# Patient Record
Sex: MALE | URBAN - METROPOLITAN AREA
[De-identification: names, ages, dates, MRNs, and addresses within clinical notes are randomized per-mention and may not be internally consistent; named-entity substitution may affect disease eponyms.]

---
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
## 2021-06-24 ENCOUNTER — EMERGENCY (EMERGENCY)
Facility: HOSPITAL | Age: 21
LOS: 1 days | Discharge: HOME | End: 2021-06-24
Attending: EMERGENCY MEDICINE
Payer: MEDICAID

## 2021-06-24 VITALS
TEMPERATURE: 98 F | OXYGEN SATURATION: 98 % | HEIGHT: 69 IN | DIASTOLIC BLOOD PRESSURE: 68 MMHG | SYSTOLIC BLOOD PRESSURE: 103 MMHG | WEIGHT: 154.98 LBS | HEART RATE: 75 BPM | RESPIRATION RATE: 18 BRPM

## 2021-06-24 PROCEDURE — 70486 CT MAXILLOFACIAL W/O DYE: CPT

## 2021-06-24 PROCEDURE — G1004: CPT

## 2021-06-24 PROCEDURE — 99284 EMERGENCY DEPT VISIT MOD MDM: CPT

## 2021-06-24 PROCEDURE — 70450 CT HEAD/BRAIN W/O DYE: CPT

## 2021-06-24 PROCEDURE — 72125 CT NECK SPINE W/O DYE: CPT | Mod: 26

## 2021-06-24 PROCEDURE — 70450 CT HEAD/BRAIN W/O DYE: CPT | Mod: 26

## 2021-06-24 PROCEDURE — 70486 CT MAXILLOFACIAL W/O DYE: CPT | Mod: 26,MG

## 2021-06-24 PROCEDURE — 99284 EMERGENCY DEPT VISIT MOD MDM: CPT | Mod: 25

## 2021-06-24 PROCEDURE — 72125 CT NECK SPINE W/O DYE: CPT

## 2021-06-24 NOTE — ED ADULT TRIAGE NOTE - IDEAL BODY WEIGHT(KG)
89y Female with PMH of HFmEF (43% in April 2021), HTN, HLD, GERD, DVT (in 6/2020 - on Eliquis), dementia (AAOx0), depression, COVID19 in 4/2020 who presents from [facility] nurse (Jony, cell # [phone]) with hypoxia to 86 on RA    # AC systolic CHF-- EF 43% in April 2021-- no need for echo-- got lasix 40mg iv once daily. Moderate AR on echo-- will give her donnie edose of HCTZ from AM  # Hx of DVT-- continue eliquis-- duplex is negative  # Advanced dementia-- takes seroquel at night, also on memantidine and miratzapine.  # multiloculated cystic liver mass- patient looks healthy and liver enz are normal.   I spoke with her niece -- patient should be ready for DC AM   covid pending and PT eval-- they did not see her today-- activity orders not in place. CM aware DC plan AM.     71

## 2021-06-24 NOTE — ED PROVIDER NOTE - CLINICAL SUMMARY MEDICAL DECISION MAKING FREE TEXT BOX
CAT scan of head, neck, face, then reevaluate CAT scan of head, neck, face, then reevaluate.normal scans.pt counseled/pain control/follow up pmd
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
## 2021-06-24 NOTE — ED PROVIDER NOTE - PATIENT PORTAL LINK FT
You can access the FollowMyHealth Patient Portal offered by [facility] by registering at the following website: [email or web address]. By joining [employer]’s FollowMyHealth portal, you will also be able to view your health information using other applications (apps) compatible with our system.
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
## 2021-06-24 NOTE — ED PROVIDER NOTE - PHYSICAL EXAMINATION
left upper eyelid is ecchymotic. There is no visibile deformity or break in the skin. No epi, ear bleeding, head deformity, laceration, hematoma. There is no neck deformity, but there is a reduced ROM due to pain, and no point tenderness.

## 2021-10-13 ENCOUNTER — EMERGENCY (EMERGENCY)
Facility: HOSPITAL | Age: 21
LOS: 1 days | Discharge: HOME | End: 2021-10-13
Attending: STUDENT IN AN ORGANIZED HEALTH CARE EDUCATION/TRAINING PROGRAM
Payer: MEDICAID

## 2021-10-13 VITALS
OXYGEN SATURATION: 98 % | RESPIRATION RATE: 18 BRPM | SYSTOLIC BLOOD PRESSURE: 112 MMHG | HEIGHT: 69 IN | DIASTOLIC BLOOD PRESSURE: 70 MMHG | HEART RATE: 64 BPM | TEMPERATURE: 98 F | WEIGHT: 164.91 LBS

## 2021-10-13 PROCEDURE — 99283 EMERGENCY DEPT VISIT LOW MDM: CPT

## 2021-10-13 NOTE — ED ADULT NURSE NOTE - NSIMPLEMENTINTERV_GEN_ALL_ED
Implemented All Universal Safety Interventions:  [place] to call system. Call bell, personal items and telephone within reach. Instruct patient to call for assistance. Room bathroom lighting operational. Non-slip footwear when patient is off stretcher. Physically safe environment: no spills, clutter or unnecessary equipment. Stretcher in lowest position, wheels locked, appropriate side rails in place.

## 2021-10-13 NOTE — ED PROVIDER NOTE - OBJECTIVE STATEMENT
21-year-old male no PMH presenting with L neck swelling x2 days. Also notes tonsillar swelling on that side. Denies fevers, chills, swallowing difficulty, shortness of breath, or any other concerns.

## 2021-10-13 NOTE — ED PROVIDER NOTE - PATIENT PORTAL LINK FT
You can access the FollowMyHealth Patient Portal offered by [facility] by registering at the following website: [email or web address]. By joining [employer]’s FollowMyHealth portal, you will also be able to view your health information using other applications (apps) compatible with our system.

## 2021-10-13 NOTE — ED PROVIDER NOTE - CLINICAL SUMMARY MEDICAL DECISION MAKING FREE TEXT BOX
21-year-old male no PMH presenting with L neck swelling x2 days - exam c/w tonsillitis. Will discharge with PO augmentin, return precautions, PMD/ENT followup if symptoms persist.

## 2021-10-13 NOTE — ED ADULT NURSE NOTE - OBJECTIVE STATEMENT
As per pt, c/o L sided neck swelling w/ tonsillar inflammation noted x2 days. Pt denies all other symptoms.

## 2021-10-13 NOTE — ED PROVIDER NOTE - NSFOLLOWUPINSTRUCTIONS_ED_ALL_ED_FT
You were seen in the emergency department for: neck swelling  Your diagnosis for this visit was: tonsillitis, lymph node swelling   From this ED visit you were prescribed: Amoxicillin-Clavulanate twice a day for 7 days    You may be contacted by the Emergency Department Referrals Coordinator to set up your follow-up appointment within 24-48 hours of your discharge, Monday to Friday. We recommend you follow up with: your primary care doctor, Ear-Nose-Throat doctor if your symptoms continue      Please return to the Emergency Department if you experience any of the following symptoms:   - Shortness of breath or trouble breathing  - Pressure, pain or tightness in the chest  - Face drooping, arm weakness or speech difficulty  - Persistence of severe vomiting  - Head injury or loss of consciousness  - Nonstop bleeding or an open wound    (1) Follow up with your primary care physician within the next 24-48 hours as discussed. In addition, we did not find evidence of a life threatening illness on your testing here today, but listed below are the specialists that will be necessary to see as an outpatient to continue the workup.  Please call the numbers listed below or [phone] to set up the necessary appointments.  (2) Take Tylenol (up to 1000mg or 1 g)  and/or Motrin (up to 600mg) up to every 6 hours as needed for pain.   (3) If you had an IV (intravenous) line placed, it was removed. Sometimes, after IV removal, that area can be tender for a few days; if it develops redness and swelling, those could be signs of infection; in which case, return to the Emergency Department for assessment.  (4) Please continue taking all of your home medications as directed.

## 2021-10-15 ENCOUNTER — APPOINTMENT (OUTPATIENT)
Dept: OTOLARYNGOLOGY | Facility: CLINIC | Age: 21
End: 2021-10-15
Payer: MEDICAID

## 2021-10-15 VITALS
WEIGHT: 165 LBS | SYSTOLIC BLOOD PRESSURE: 120 MMHG | HEART RATE: 57 BPM | BODY MASS INDEX: 24.44 KG/M2 | DIASTOLIC BLOOD PRESSURE: 69 MMHG | HEIGHT: 69 IN

## 2021-10-15 DIAGNOSIS — Z80.0 FAMILY HISTORY OF MALIGNANT NEOPLASM OF DIGESTIVE ORGANS: ICD-10-CM

## 2021-10-15 DIAGNOSIS — Z87.09 PERSONAL HISTORY OF OTHER DISEASES OF THE RESPIRATORY SYSTEM: ICD-10-CM

## 2021-10-15 DIAGNOSIS — Z87.898 PERSONAL HISTORY OF OTHER SPECIFIED CONDITIONS: ICD-10-CM

## 2021-10-15 DIAGNOSIS — R59.0 LOCALIZED ENLARGED LYMPH NODES: ICD-10-CM

## 2021-10-15 DIAGNOSIS — Z87.39 PERSONAL HISTORY OF OTHER DISEASES OF THE MUSCULOSKELETAL SYSTEM AND CONNECTIVE TISSUE: ICD-10-CM

## 2021-10-15 DIAGNOSIS — J35.1 HYPERTROPHY OF TONSILS: ICD-10-CM

## 2021-10-15 PROCEDURE — 31575 DIAGNOSTIC LARYNGOSCOPY: CPT

## 2021-10-15 PROCEDURE — 99203 OFFICE O/P NEW LOW 30 MIN: CPT | Mod: 25

## 2021-10-15 NOTE — PROCEDURE
[de-identified] : Procedure performed: Fiberoptic Laryngeal Endoscopy - Diagnostic\par After informed verbal consent topical neosynephrine and lidocaine were applied, the fiberoptic nasal endoscope was\par passed via the R/L nasal cavity without incident. The patient tolerated the procedure quite well.\par The nasal cavity was normal without evidence of masses, polyps, lesions or drainage. The nasal septum and turbinates are within normal limits.\par Nasopharynx was normal without lesions or masses. Eustachian tube orifice normal bilaterals. Oropharynx and Hypopharyngeal mucosa are within normal limits without lesions masses, ulcerations or concerning findings. True and false vocal folds are within normal limits, normal sensory and motor examination. The base of tongue, vallecula, epiglottis are within normal limits without evidence of lesions or abnormalities.\par \par Positive Findings:\par Other than the obvious enlarged right tonsil I see no other mucosal abnormalities the base of tongue vallecula and epiglottis did not appear to be involved with this process there is no pharyngeal wall involvement appears to be tonsillar enlargement

## 2021-10-15 NOTE — PHYSICAL EXAM
[de-identified] : Right level 2 cervical adenopathy palpable mildly tender but notably enlarged relative to the contralateral side [Midline] : trachea located in midline position [de-identified] : Marked asymmetry with the right tonsil larger than left with exudate, left side is within normal limits [Normal] : no rashes

## 2021-10-15 NOTE — REASON FOR VISIT
[Initial Evaluation] : an initial evaluation for [FreeTextEntry2] : referred by [facility] ([place]) for swollen lymph nodes.

## 2021-10-15 NOTE — HISTORY OF PRESENT ILLNESS
[de-identified] : 21 year old male referred by [facility] for swollen lymph nodes. \par Reports went to [facility] emergency room 10/13/2021 for painful lump on right side of neck, was prescribed Augmentin but has not taken. Denies imaging.\par Reports mild headaches with pressure behind left eye for 2-3 days when he first noticed lump on neck 10/08/2021, took advil for 1 day and resolved.\par Reports odynophagia with solids and liquids. Reports raspy voice for 3 days. \par Denies dysphagia, aspirations, dyspnea, otalgia.\par Reports noticed lump under right axillary this morning, painful when he puts pressure on it. \par History of marijuana use for 3 years- quit 2 months ago. Denies alcohol use.\par Denies fevers, chills, night sweats, weight loss. \par Denies COVID vaccine. \par  [FreeTextEntry1] : Had a discussion with the patient he has never had this issue before is having no fever chills nausea vomiting, process appears to be limited to the right side.  In addition he was prescribed some antibiotics he has not started taking this yet and will fill his prescription later today for Augmentin 875.

## 2021-10-20 ENCOUNTER — EMERGENCY (EMERGENCY)
Facility: HOSPITAL | Age: 21
LOS: 1 days | Discharge: HOME | End: 2021-10-20
Attending: STUDENT IN AN ORGANIZED HEALTH CARE EDUCATION/TRAINING PROGRAM
Payer: MEDICAID

## 2021-10-20 VITALS
WEIGHT: 164.91 LBS | HEIGHT: 69 IN | RESPIRATION RATE: 89 BRPM | DIASTOLIC BLOOD PRESSURE: 82 MMHG | SYSTOLIC BLOOD PRESSURE: 125 MMHG | OXYGEN SATURATION: 99 % | TEMPERATURE: 98 F | HEART RATE: 89 BPM

## 2021-10-20 VITALS
DIASTOLIC BLOOD PRESSURE: 80 MMHG | SYSTOLIC BLOOD PRESSURE: 120 MMHG | RESPIRATION RATE: 18 BRPM | HEART RATE: 85 BPM | OXYGEN SATURATION: 99 %

## 2021-10-20 PROCEDURE — 99283 EMERGENCY DEPT VISIT LOW MDM: CPT

## 2021-10-20 RX ADMIN — Medication 300 MILLIGRAM(S): at 17:29

## 2021-10-20 NOTE — ED ADULT NURSE NOTE - NSIMPLEMENTINTERV_GEN_ALL_ED
Implemented All Universal Safety Interventions:  [place] to call system. Call bell, personal items and telephone within reach. Instruct patient to call for assistance. Room bathroom lighting operational. Non-slip footwear when patient is off stretcher. Physically safe environment: no spills, clutter or unnecessary equipment. Stretcher in lowest position, wheels locked, appropriate side rails in place.

## 2021-10-20 NOTE — ED ADULT NURSE NOTE - CAS ELECT INFOMATION PROVIDED
Encounter addended by: Kathy Laguerre PsyD on: 12/3/2019 5:27 PM   Actions taken: Order Reconciliation Section accessed DC instructions

## 2021-10-20 NOTE — ED PROVIDER NOTE - OBJECTIVE STATEMENT
21 year old male with a history of asthma presents with right tonsillar swelling and right lymph node swelling for 8 days. Patient reports he was evaluated here 6 days ago. Prescribed Augmentin and told to follow up with ENT. Followed up with ENT 4 days ago who said to continue to take the antibiotics. Patient returns as he reports the tonsilar swelling is worsening. Reports his right lymph node has decreased in size. Reports he is now having diarrhea from the antibiotics as well. Denies fevers, inability to swallow, trouble breathing, cough, rhinorrhea.

## 2021-10-20 NOTE — ED PROVIDER NOTE - PATIENT PORTAL LINK FT
You can access the FollowMyHealth Patient Portal offered by [facility] by registering at the following website: [email or web address]. By joining [employer]’s FollowMyHealth portal, you will also be able to view your health information using other applications (apps) compatible with our system.

## 2021-10-20 NOTE — ED PROVIDER NOTE - CLINICAL SUMMARY MEDICAL DECISION MAKING FREE TEXT BOX
21 year old male with right sided tonsilar swelling and exudate. +right anterior cervical lymph node swelling. Likely tim-tonsillar abscess. Will drain and dc home on clindamycin.

## 2021-10-20 NOTE — ED PROCEDURE NOTE - ATTENDING CONTRIBUTION TO CARE
I performed the initial face to face bedside interview with this patient regarding history of present illness, review of symptoms and past medical, social and family history.  I completed an independent physical examination.  I was the initial provider who evaluated this patient.  The history, review of symptoms and examination was documented by the scribe in my presence and I attest to the accuracy of the documentation.  I have signed out the follow up of any pending tests (i.e. labs, radiological studies) to the PA/NP.  I have discussed the patient’s plan of care and disposition with the PA/NP.     well appearing male, no acute distress, normal work of breathing, attempted I&D. patient tolerated well.

## 2021-10-20 NOTE — ED PROVIDER NOTE - THROAT FINDINGS
Right tonsilar swelling with white exudate./OROPHARYNGEAL EXUDATE/THROAT RED/uvula midline/HOARSE/MUFFLED VOICE/PERITONSILLAR ABSCESS/TONSILLAR SWELLING

## 2021-10-20 NOTE — ED PROVIDER NOTE - ATTENDING CONTRIBUTION TO CARE
I performed the initial face to face bedside interview with this patient regarding history of present illness, review of symptoms and past medical, social and family history.  I completed an independent physical examination.  I was the initial provider who evaluated this patient.  The history, review of symptoms and examination was documented by the scribe in my presence and I attest to the accuracy of the documentation.  I have signed out the follow up of any pending tests (i.e. labs, radiological studies) to the PA/NP.  I have discussed the patient’s plan of care and disposition with the PA/NP.     well appearing male, no acute distress, uvula midline, right peritonsillar abscess. attempted drainage, but minimal output. will give antibiotics and ENT follow-up.

## 2021-10-20 NOTE — ED PROVIDER NOTE - NSFOLLOWUPCLINICS_GEN_ALL_ED_FT
[facility]  ENT  [address] [place] [address]  Phone: [phone]  Fax: [phone]

## 2021-10-20 NOTE — ED PROVIDER NOTE - NSFOLLOWUPINSTRUCTIONS_ED_ALL_ED_FT
Follow up with the ENT doctor in 24 hours.   If you experience any new or worsening symptoms or if you are concerned you can always come back to the emergency for a re-evaluation.  If there were any prescriptions given to you during the visit today take them as prescribed. If you have any questions you can ask the pharmacist.

## 2021-10-20 NOTE — ED PROVIDER NOTE - PROGRESS NOTE DETAILS
Abscess did not drain during I&D. Will give clinda and have patient follow up with ENT asap.     Pt is well appearing walking with steady gait, stable for discharge and follow up without fail with medical doctor. I had a detailed discussion with the patient and/or guardian regarding the historical points, exam findings, and any diagnostic results supporting the discharge diagnosis. Pt educated on care and need for follow up. Strict return instructions and red flag signs and symptoms discussed with patient. Questions answered. Pt shows understanding of discharge information and agrees to follow.

## 2021-11-05 ENCOUNTER — APPOINTMENT (OUTPATIENT)
Dept: OTOLARYNGOLOGY | Facility: CLINIC | Age: 21
End: 2021-11-05

## 2022-07-18 ENCOUNTER — EMERGENCY (EMERGENCY)
Facility: HOSPITAL | Age: 22
LOS: 1 days | Discharge: LEFT BEFORE DISCHARGE | End: 2022-07-18
Attending: STUDENT IN AN ORGANIZED HEALTH CARE EDUCATION/TRAINING PROGRAM
Payer: MEDICAID

## 2022-07-18 VITALS
HEIGHT: 69 IN | SYSTOLIC BLOOD PRESSURE: 115 MMHG | WEIGHT: 184.97 LBS | OXYGEN SATURATION: 98 % | TEMPERATURE: 98 F | HEART RATE: 60 BPM | DIASTOLIC BLOOD PRESSURE: 72 MMHG

## 2022-07-18 DIAGNOSIS — Z98.890 OTHER SPECIFIED POSTPROCEDURAL STATES: Chronic | ICD-10-CM

## 2022-07-18 PROCEDURE — 99282 EMERGENCY DEPT VISIT SF MDM: CPT

## 2022-07-18 NOTE — ED PROVIDER NOTE - CLINICAL SUMMARY MEDICAL DECISION MAKING FREE TEXT BOX
21 year old male sent in for left foot and ankle injury. Will dinorah x-ray for ankle and foot. Pt was offered medication for pain but declined. 21 year old male presents for left foot and ankle injury. Will get x-ray for ankle and foot. Pt was offered medication for pain but declined.

## 2022-07-18 NOTE — ED ADULT NURSE NOTE - NSICDXPASTSURGICALHX_GEN_ALL_CORE_FT
Contacted  pharmacy.  Per Tech Rx was filled and picked up. Ok to disregard refill request.     PAST SURGICAL HISTORY:  H/O umbilical hernia repair

## 2022-07-18 NOTE — ED PROVIDER NOTE - NS ED ATTENDING STATEMENT MOD
This was a shared visit with the STEFFANIE. I reviewed and verified the documentation and independently performed the documented:

## 2022-07-18 NOTE — ED PROVIDER NOTE - PROGRESS NOTE DETAILS
Patient eloped.  Called on the phone, states he was waiting too long for XR so he left.  Then started cursing at provider and disconnected.

## 2022-07-18 NOTE — ED PROVIDER NOTE - OBJECTIVE STATEMENT
21 year old male with PMHx of asthma and umbilical hernia repair is presenting to the ED with chief complaint of left foot and ankle pain for the past 2 days. Patient was playing basketball when he jumped and landed on someone's foot, which caused an eversion. Patient has pain bearing weight on his left leg. No other injuries. NKDA

## 2022-07-18 NOTE — ED PROVIDER NOTE - MUSCULOSKELETAL, MLM
Mild swelling and tenderness to palpation to the lateral malleolus of left ankle. Mild tenderness to left lateral foot. DP/PT pulses 2+. Left leg with full range of motion. Able to lift toes and ambulate on foot., NO swelling or tenderness to palpation to the left ankle or foot but patient endorsing pain to the area. DP/PT pulses 2+. Left leg with full range of motion. Able to lift toes and ambulate on foot.,

## 2023-01-05 NOTE — ED ADULT TRIAGE NOTE - BP NONINVASIVE SYSTOLIC (MM HG)
Left message for patient to remind of appt date and time and the need to do labs prior to appt.  
128

## 2023-01-29 ENCOUNTER — EMERGENCY (EMERGENCY)
Facility: HOSPITAL | Age: 23
LOS: 1 days | Discharge: HOME | End: 2023-01-29
Attending: STUDENT IN AN ORGANIZED HEALTH CARE EDUCATION/TRAINING PROGRAM
Payer: MEDICAID

## 2023-01-29 VITALS
DIASTOLIC BLOOD PRESSURE: 75 MMHG | RESPIRATION RATE: 17 BRPM | OXYGEN SATURATION: 98 % | SYSTOLIC BLOOD PRESSURE: 128 MMHG | TEMPERATURE: 99 F | HEART RATE: 87 BPM | WEIGHT: 179.9 LBS | HEIGHT: 69 IN

## 2023-01-29 DIAGNOSIS — Z98.890 OTHER SPECIFIED POSTPROCEDURAL STATES: Chronic | ICD-10-CM

## 2023-01-29 LAB
RAPID RVP RESULT: SIGNIFICANT CHANGE UP
SARS-COV-2 RNA SPEC QL NAA+PROBE: SIGNIFICANT CHANGE UP

## 2023-01-29 PROCEDURE — 99283 EMERGENCY DEPT VISIT LOW MDM: CPT

## 2023-01-29 PROCEDURE — 0225U NFCT DS DNA&RNA 21 SARSCOV2: CPT

## 2023-01-29 PROCEDURE — 87081 CULTURE SCREEN ONLY: CPT

## 2023-01-29 PROCEDURE — 99284 EMERGENCY DEPT VISIT MOD MDM: CPT

## 2023-01-29 PROCEDURE — 96372 THER/PROPH/DIAG INJ SC/IM: CPT

## 2023-01-29 RX ORDER — IBUPROFEN 200 MG
1 TABLET ORAL
Qty: 20 | Refills: 0
Start: 2023-01-29

## 2023-01-29 RX ORDER — KETOROLAC TROMETHAMINE 30 MG/ML
30 SYRINGE (ML) INJECTION ONCE
Refills: 0 | Status: DISCONTINUED | OUTPATIENT
Start: 2023-01-29 | End: 2023-01-29

## 2023-01-29 RX ADMIN — Medication 30 MILLIGRAM(S): at 09:31

## 2023-01-29 NOTE — ED PROVIDER NOTE - PHYSICAL EXAMINATION
CONSTITUTIONAL: non-toxic, well appearing  SKIN: no rash, no petechiae.  EYES: pink conjunctiva, anicteric  ENT: tongue and uvular midline, no exudates, moist mucous membranes, no trismus, no drooling  NECK: Supple; no meningismus, FROM neck, no nuchal rigidity  CARD: RRR, no murmurs, equal radial pulses bilaterally 2+  RESP: CTAB, no respiratory distress  ABD: Soft, non-tender, non-distended, no peritoneal signs  EXT: Normal ROM x4. No edema.   NEURO: Alert, oriented. Neuro exam nonfocal.  PSYCH: Cooperative, appropriate.

## 2023-01-29 NOTE — ED PROVIDER NOTE - PATIENT PORTAL LINK FT
You can access the FollowMyHealth Patient Portal offered by [facility] by registering at the following website: [email or web address]. By joining [employer]’s FollowMyHealth portal, you will also be able to view your health information using other applications (apps) compatible with our system.

## 2023-01-29 NOTE — ED PROVIDER NOTE - CLINICAL SUMMARY MEDICAL DECISION MAKING FREE TEXT BOX
Alyson: 22-year-old male with past medical history asthma (never intubated) presents with sore throat since yesterday.  Patient reports sore throat, pain with swallowing, occipital headache, and chills since yesterday.  Denies any fevers, drooling, voice change, nasal congestion, cough, change in mental status, difficulty breathing, chest pain, abdominal pain, vomiting, diarrhea, numbness, weakness, or rash.  Patient reports taking ibuprofen yesterday with improvement.  Denies any recent injury or trauma.  Patient tolerating p.o. solids and liquids.  On exam, tongue and uvular midline, no exudates, moist mucous membranes, no trismus, no drooling, neck supple without meningismus, FROM neck, no nuchal rigidity. Likely viral syndrome, no signs of tonsilitis, RPA, PTA, or meningitis. Vital signs non-actionable. Will provide supportive treatment, obtain RVP eval for viral syndrome, DC with PMD follow up/return precautions.

## 2023-01-29 NOTE — ED PROVIDER NOTE - NSFOLLOWUPINSTRUCTIONS_ED_ALL_ED_FT
Pharyngitis    Pharyngitis is inflammation of your pharynx, which is typically caused by a viral or bacterial infection. Pharyngitis can be contagious and may spread from person to person through intimate contact, coughing, sneezing, or sharing personal items and utensils. Symptoms of pharyngitis may include sore throat, fever, headache, or swollen lymph nodes. If you are prescribed antibiotics, make sure you finish them even if you start to feel better. Gargle with salt water as often as every 1-2 hours to soothe your throat. Throat lozenges (if you are not at risk for choking) or sprays may be used to soothe your throat.    Take over the counter acetaminophen (Tylenol) 650-1000 mg every 4-6 hours as needed for pain. Do not take more than 3000 mg in a 24 hour period. Be aware many over the counter and prescription medications also contain acetaminophen (Tylenol).     Take prescription ibuprofen 600 mg every 6 hours with food as needed for pain.    Follow up with your primary care physician in 1-3 days.     SEEK IMMEDIATE MEDICAL CARE IF YOU HAVE ANY OF THE FOLLOWING SYMPTOMS: drooling, hoarseness or change in voice, inability to swallow liquids, vomiting, or trouble breathing.

## 2023-01-29 NOTE — ED PROVIDER NOTE - OBJECTIVE STATEMENT
Cbc,cmp, FLC, SPEP 1 wk prior to f/u  F/u on 24 hr urine studies?  22-year-old male with past medical history asthma (never intubated) presents with sore throat since yesterday.  Patient reports sore throat, pain with swallowing, occipital headache, and chills since yesterday.  Denies any fevers, drooling, voice change, nasal congestion, cough, change in mental status, difficulty breathing, chest pain, abdominal pain, vomiting, diarrhea, numbness, weakness, or rash.  Patient reports taking ibuprofen yesterday with improvement.  Denies any recent injury or trauma.  Patient tolerating p.o. solids and liquids.  Denies any additional complaints.

## 2023-01-30 LAB
CULTURE RESULTS: SIGNIFICANT CHANGE UP
SPECIMEN SOURCE: SIGNIFICANT CHANGE UP

## 2023-01-31 RX ORDER — AZITHROMYCIN 500 MG/1
1 TABLET, FILM COATED ORAL
Qty: 6 | Refills: 0
Start: 2023-01-31 | End: 2023-02-04

## 2024-07-31 NOTE — ED ADULT NURSE NOTE - NURSING NEURO ORIENTATION
[General Appearance - Well Developed] : well developed [Normal Appearance] : normal appearance [Well Groomed] : well groomed [General Appearance - Well Nourished] : well nourished [No Deformities] : no deformities [General Appearance - In No Acute Distress] : no acute distress [Normal Conjunctiva] : the conjunctiva exhibited no abnormalities [Eyelids - No Xanthelasma] : the eyelids demonstrated no xanthelasmas [Normal Oral Mucosa] : normal oral mucosa [No Oral Pallor] : no oral pallor [No Oral Cyanosis] : no oral cyanosis [Normal Jugular Venous A Waves Present] : normal jugular venous A waves present [Normal Jugular Venous V Waves Present] : normal jugular venous V waves present [No Jugular Venous Jones A Waves] : no jugular venous jones A waves [Respiration, Rhythm And Depth] : normal respiratory rhythm and effort [Exaggerated Use Of Accessory Muscles For Inspiration] : no accessory muscle use [Auscultation Breath Sounds / Voice Sounds] : lungs were clear to auscultation bilaterally [Heart Rate And Rhythm] : heart rate and rhythm were normal [Heart Sounds] : normal S1 and S2 [Abdomen Soft] : soft [Abdomen Tenderness] : non-tender [] : no hepato-splenomegaly [Nail Clubbing] : no clubbing of the fingernails [Cyanosis, Localized] : no localized cyanosis [Oriented To Time, Place, And Person] : oriented to person, place, and time oriented to person, place and time

## 2024-11-11 NOTE — ED ADULT TRIAGE NOTE - AS TEMP SITE
Quality 47: Advance Care Plan: Advance Care Planning discussed and documented in the medical record; patient did not wish or was not able to name a surrogate decision maker or provide an advance care plan. Quality 226: Preventive Care And Screening: Tobacco Use: Screening And Cessation Intervention: Patient screened for tobacco use and is an ex/non-smoker Detail Level: Detailed oral

## 2025-02-14 NOTE — ED PROVIDER NOTE - WORK/EXCUSE FORM DATE
"Relay     \"A1c was essentially stable and urine was negative for any protein. Uric acid levl was normal.     Recommend low fat, low cholesterol, low salt, and low sugar diet along with fresh fruits and vegetables. Also recommend walking for exercise 20 to 30 minutes per day about 5 days per week. \"                " 24-Jun-2021